# Patient Record
Sex: MALE | URBAN - METROPOLITAN AREA
[De-identification: names, ages, dates, MRNs, and addresses within clinical notes are randomized per-mention and may not be internally consistent; named-entity substitution may affect disease eponyms.]

---

## 2021-09-29 ENCOUNTER — OFFICE VISIT (OUTPATIENT)
Dept: URBAN - METROPOLITAN AREA CLINIC 11 | Facility: CLINIC | Age: 51
End: 2021-09-29

## 2021-09-29 DIAGNOSIS — H00.021 HORDEOLUM INTERNUM RIGHT UPPER EYELID: Primary | ICD-10-CM

## 2021-09-29 PROCEDURE — 99203 OFFICE O/P NEW LOW 30 MIN: CPT | Performed by: OPTOMETRIST

## 2021-09-29 RX ORDER — AMOXICILLIN AND CLAVULANATE POTASSIUM 500; 125 MG/1; 1/1
TABLET, FILM COATED ORAL
Qty: 21 | Refills: 0 | Status: ACTIVE
Start: 2021-09-29

## 2021-09-29 ASSESSMENT — INTRAOCULAR PRESSURE
OD: 14
OS: 15

## 2021-09-29 ASSESSMENT — KERATOMETRY
OD: 43.38
OS: 44.50

## 2021-09-29 NOTE — IMPRESSION/PLAN
Impression: Hordeolum internum right upper eyelid: H00.021. Plan: Rx Augmentin 500mg 1 tab tid PO x 1wk. Maxitrol mohit bid to lid.  f/u 2wks